# Patient Record
Sex: FEMALE | Race: WHITE | NOT HISPANIC OR LATINO
[De-identification: names, ages, dates, MRNs, and addresses within clinical notes are randomized per-mention and may not be internally consistent; named-entity substitution may affect disease eponyms.]

---

## 2021-04-30 PROBLEM — Z00.00 ENCOUNTER FOR PREVENTIVE HEALTH EXAMINATION: Status: ACTIVE | Noted: 2021-04-30

## 2021-05-20 ENCOUNTER — APPOINTMENT (OUTPATIENT)
Dept: OBGYN | Facility: CLINIC | Age: 21
End: 2021-05-20
Payer: COMMERCIAL

## 2021-05-20 VITALS
WEIGHT: 125 LBS | HEIGHT: 63 IN | DIASTOLIC BLOOD PRESSURE: 68 MMHG | SYSTOLIC BLOOD PRESSURE: 102 MMHG | BODY MASS INDEX: 22.15 KG/M2 | TEMPERATURE: 98 F

## 2021-05-20 DIAGNOSIS — Z30.011 ENCOUNTER FOR INITIAL PRESCRIPTION OF CONTRACEPTIVE PILLS: ICD-10-CM

## 2021-05-20 DIAGNOSIS — Z87.42 PERSONAL HISTORY OF OTHER DISEASES OF THE FEMALE GENITAL TRACT: ICD-10-CM

## 2021-05-20 DIAGNOSIS — N94.6 DYSMENORRHEA, UNSPECIFIED: ICD-10-CM

## 2021-05-20 DIAGNOSIS — N92.0 EXCESSIVE AND FREQUENT MENSTRUATION WITH REGULAR CYCLE: ICD-10-CM

## 2021-05-20 PROCEDURE — 99213 OFFICE O/P EST LOW 20 MIN: CPT | Mod: 25

## 2021-05-20 PROCEDURE — 99395 PREV VISIT EST AGE 18-39: CPT

## 2021-05-20 NOTE — HISTORY OF PRESENT ILLNESS
[FreeTextEntry1] : Patient is 20 years old para 0-0-0-0 last menstrual period May 7, 2021\par She has a history of regular menstrual periods every month lasting approximately 8 days which are heavy and painful.  She is interested in starting oral contraceptives

## 2021-05-20 NOTE — DISCUSSION/SUMMARY
[FreeTextEntry1] : Pap done\par Self breast exam stressed\par Patient given 2 sample packages of oral contraceptives in the form of Lo Loestrin Fe with instructions/precautions, will continue with prescription if patient tolerates trial of oral contraceptives.\par Keep menstrual calendar\par Follow-up 6 months or as needed

## 2021-07-25 ENCOUNTER — EMERGENCY (EMERGENCY)
Facility: HOSPITAL | Age: 21
LOS: 0 days | Discharge: HOME | End: 2021-07-25
Attending: EMERGENCY MEDICINE | Admitting: EMERGENCY MEDICINE
Payer: COMMERCIAL

## 2021-07-25 VITALS
OXYGEN SATURATION: 99 % | RESPIRATION RATE: 18 BRPM | SYSTOLIC BLOOD PRESSURE: 100 MMHG | HEART RATE: 65 BPM | DIASTOLIC BLOOD PRESSURE: 64 MMHG

## 2021-07-25 VITALS
RESPIRATION RATE: 18 BRPM | OXYGEN SATURATION: 100 % | TEMPERATURE: 99 F | SYSTOLIC BLOOD PRESSURE: 128 MMHG | HEART RATE: 81 BPM | DIASTOLIC BLOOD PRESSURE: 71 MMHG | WEIGHT: 125 LBS

## 2021-07-25 DIAGNOSIS — R20.2 PARESTHESIA OF SKIN: ICD-10-CM

## 2021-07-25 DIAGNOSIS — M79.10 MYALGIA, UNSPECIFIED SITE: ICD-10-CM

## 2021-07-25 DIAGNOSIS — Z88.0 ALLERGY STATUS TO PENICILLIN: ICD-10-CM

## 2021-07-25 DIAGNOSIS — J02.9 ACUTE PHARYNGITIS, UNSPECIFIED: ICD-10-CM

## 2021-07-25 DIAGNOSIS — R50.9 FEVER, UNSPECIFIED: ICD-10-CM

## 2021-07-25 DIAGNOSIS — Z88.8 ALLERGY STATUS TO OTHER DRUGS, MEDICAMENTS AND BIOLOGICAL SUBSTANCES: ICD-10-CM

## 2021-07-25 LAB
ALBUMIN SERPL ELPH-MCNC: 4.9 G/DL — SIGNIFICANT CHANGE UP (ref 3.5–5.2)
ALP SERPL-CCNC: 81 U/L — SIGNIFICANT CHANGE UP (ref 30–115)
ALT FLD-CCNC: 18 U/L — SIGNIFICANT CHANGE UP (ref 14–37)
ANION GAP SERPL CALC-SCNC: 13 MMOL/L — SIGNIFICANT CHANGE UP (ref 7–14)
APTT BLD: 30.9 SEC — SIGNIFICANT CHANGE UP (ref 27–39.2)
AST SERPL-CCNC: 50 U/L — HIGH (ref 14–37)
BASOPHILS # BLD AUTO: 0.05 K/UL — SIGNIFICANT CHANGE UP (ref 0–0.2)
BASOPHILS NFR BLD AUTO: 0.7 % — SIGNIFICANT CHANGE UP (ref 0–1)
BILIRUB SERPL-MCNC: 0.2 MG/DL — SIGNIFICANT CHANGE UP (ref 0.2–1.2)
BUN SERPL-MCNC: 11 MG/DL — SIGNIFICANT CHANGE UP (ref 10–20)
CALCIUM SERPL-MCNC: 10.3 MG/DL — HIGH (ref 8.5–10.1)
CHLORIDE SERPL-SCNC: 103 MMOL/L — SIGNIFICANT CHANGE UP (ref 98–110)
CO2 SERPL-SCNC: 23 MMOL/L — SIGNIFICANT CHANGE UP (ref 17–32)
CREAT SERPL-MCNC: 0.8 MG/DL — SIGNIFICANT CHANGE UP (ref 0.3–1)
D DIMER BLD IA.RAPID-MCNC: 81 NG/ML DDU — SIGNIFICANT CHANGE UP (ref 0–230)
EOSINOPHIL # BLD AUTO: 0.06 K/UL — SIGNIFICANT CHANGE UP (ref 0–0.7)
EOSINOPHIL NFR BLD AUTO: 0.9 % — SIGNIFICANT CHANGE UP (ref 0–8)
GLUCOSE SERPL-MCNC: 97 MG/DL — SIGNIFICANT CHANGE UP (ref 70–99)
HCG SERPL QL: NEGATIVE — SIGNIFICANT CHANGE UP
HCT VFR BLD CALC: 44.5 % — SIGNIFICANT CHANGE UP (ref 37–47)
HGB BLD-MCNC: 14.3 G/DL — SIGNIFICANT CHANGE UP (ref 12–16)
IMM GRANULOCYTES NFR BLD AUTO: 0.4 % — HIGH (ref 0.1–0.3)
INR BLD: 1.06 RATIO — SIGNIFICANT CHANGE UP (ref 0.65–1.3)
LYMPHOCYTES # BLD AUTO: 1.7 K/UL — SIGNIFICANT CHANGE UP (ref 1.2–3.4)
LYMPHOCYTES # BLD AUTO: 24.3 % — SIGNIFICANT CHANGE UP (ref 20.5–51.1)
MAGNESIUM SERPL-MCNC: 2.3 MG/DL — SIGNIFICANT CHANGE UP (ref 1.8–2.4)
MCHC RBC-ENTMCNC: 28.7 PG — SIGNIFICANT CHANGE UP (ref 27–31)
MCHC RBC-ENTMCNC: 32.1 G/DL — SIGNIFICANT CHANGE UP (ref 32–37)
MCV RBC AUTO: 89.2 FL — SIGNIFICANT CHANGE UP (ref 81–99)
MONOCYTES # BLD AUTO: 0.66 K/UL — HIGH (ref 0.1–0.6)
MONOCYTES NFR BLD AUTO: 9.4 % — HIGH (ref 1.7–9.3)
NEUTROPHILS # BLD AUTO: 4.5 K/UL — SIGNIFICANT CHANGE UP (ref 1.4–6.5)
NEUTROPHILS NFR BLD AUTO: 64.3 % — SIGNIFICANT CHANGE UP (ref 42.2–75.2)
NRBC # BLD: 0 /100 WBCS — SIGNIFICANT CHANGE UP (ref 0–0)
PLATELET # BLD AUTO: 209 K/UL — SIGNIFICANT CHANGE UP (ref 130–400)
POTASSIUM SERPL-MCNC: 5.5 MMOL/L — HIGH (ref 3.5–5)
POTASSIUM SERPL-SCNC: 5.5 MMOL/L — HIGH (ref 3.5–5)
PROT SERPL-MCNC: 7.6 G/DL — SIGNIFICANT CHANGE UP (ref 6.1–8)
PROTHROM AB SERPL-ACNC: 12.2 SEC — SIGNIFICANT CHANGE UP (ref 9.95–12.87)
RBC # BLD: 4.99 M/UL — SIGNIFICANT CHANGE UP (ref 4.2–5.4)
RBC # FLD: 14.2 % — SIGNIFICANT CHANGE UP (ref 11.5–14.5)
SODIUM SERPL-SCNC: 139 MMOL/L — SIGNIFICANT CHANGE UP (ref 135–146)
WBC # BLD: 7 K/UL — SIGNIFICANT CHANGE UP (ref 4.8–10.8)
WBC # FLD AUTO: 7 K/UL — SIGNIFICANT CHANGE UP (ref 4.8–10.8)

## 2021-07-25 PROCEDURE — 93010 ELECTROCARDIOGRAM REPORT: CPT

## 2021-07-25 PROCEDURE — 99285 EMERGENCY DEPT VISIT HI MDM: CPT

## 2021-07-25 PROCEDURE — 71046 X-RAY EXAM CHEST 2 VIEWS: CPT | Mod: 26

## 2021-07-25 NOTE — ED PROVIDER NOTE - CLINICAL SUMMARY MEDICAL DECISION MAKING FREE TEXT BOX
Results reviewed and d/w pt and dad.  Pt will follow with her her Pediatrician and return if any worsening symptoms or concerns.

## 2021-07-25 NOTE — ED PROVIDER NOTE - NSFOLLOWUPINSTRUCTIONS_ED_ALL_ED_FT
Paresthesia    Paresthesia is an abnormal burning or prickling sensation. This sensation is generally felt in the hands, arms, legs, or feet. However, it may occur in any part of the body. Usually, it is not painful. The feeling may be described as:    Tingling or numbness.  Pins and needles.  Skin crawling.  Buzzing.  Limbs falling asleep.  Itching.    Most people experience temporary (transient) paresthesia at some time in their lives. Paresthesia may occur when you breathe too quickly (hyperventilation). It can also occur without any apparent cause. Commonly, paresthesia occurs when pressure is placed on a nerve. The sensation quickly goes away after the pressure is removed. For some people, however, paresthesia is a long-lasting (chronic) condition that is caused by an underlying disorder. If you continue to have paresthesia, you may need further medical evaluation.    Follow these instructions at home:  Watch your condition for any changes. Taking the following actions may help to lessen any discomfort that you are feeling:    Avoid drinking alcohol.  Try acupuncture or massage to help relieve your symptoms.  Keep all follow-up visits as directed by your health care provider. This is important.    Contact a health care provider if:  You continue to have episodes of paresthesia.  Your burning or prickling feeling gets worse when you walk.  You have pain, cramps, or dizziness.  You develop a rash.  Get help right away if:  You feel weak.  You have trouble walking or moving.  You have problems with speech, understanding, or vision.  You feel confused.  You cannot control your bladder or bowel movements.  You have numbness after an injury.  You faint.  This information is not intended to replace advice given to you by your health care provider. Make sure you discuss any questions you have with your health care provider.

## 2021-07-25 NOTE — ED PROVIDER NOTE - PATIENT PORTAL LINK FT
You can access the FollowMyHealth Patient Portal offered by [facility] by registering at the following website: [email or web address]. By joining [employer]’s FollowMyHealth portal, you will also be able to view your health information using other applications (apps) compatible with our system.

## 2021-07-25 NOTE — ED PROVIDER NOTE - IV ALTEPLASE DOOR HIDDEN
show Nosebleeds Normal Treatment: I explained this is common when taking isotretinoin. I recommended saline mist in each nostril multiple times a day. If this worsens they will contact us.

## 2021-07-25 NOTE — ED PROVIDER NOTE - ATTENDING CONTRIBUTION TO CARE
21 yo F presents with diffuse intermittent paresthesias for the last 2 days.  no pain, no weakness, no SOB.  Pt received 2nd COVID vaccine this past week.  Also with slight sore throat.  no fevers, On exam pt in NAD AAO x 3, speech is clear and fluent, face symmetrical, Lungs cta b/l, abd soft nt nd, no edema, FROM, steady gait, sensation intact

## 2021-07-25 NOTE — ED PROVIDER NOTE - OBJECTIVE STATEMENT
Patient c/o intermittent paresthesias which migrate around body past 2 days, Had 2nd covid vaccination Monday , Mild Fever and body aches for 48 hours,

## 2021-07-28 ENCOUNTER — EMERGENCY (EMERGENCY)
Facility: HOSPITAL | Age: 21
LOS: 0 days | Discharge: HOME | End: 2021-07-28
Attending: EMERGENCY MEDICINE | Admitting: EMERGENCY MEDICINE
Payer: COMMERCIAL

## 2021-07-28 VITALS
SYSTOLIC BLOOD PRESSURE: 121 MMHG | WEIGHT: 129.41 LBS | TEMPERATURE: 99 F | RESPIRATION RATE: 18 BRPM | OXYGEN SATURATION: 98 % | HEART RATE: 87 BPM | DIASTOLIC BLOOD PRESSURE: 65 MMHG

## 2021-07-28 DIAGNOSIS — R20.2 PARESTHESIA OF SKIN: ICD-10-CM

## 2021-07-28 DIAGNOSIS — M62.81 MUSCLE WEAKNESS (GENERALIZED): ICD-10-CM

## 2021-07-28 DIAGNOSIS — Z88.8 ALLERGY STATUS TO OTHER DRUGS, MEDICAMENTS AND BIOLOGICAL SUBSTANCES STATUS: ICD-10-CM

## 2021-07-28 DIAGNOSIS — R53.83 OTHER FATIGUE: ICD-10-CM

## 2021-07-28 DIAGNOSIS — Z88.0 ALLERGY STATUS TO PENICILLIN: ICD-10-CM

## 2021-07-28 PROBLEM — Z78.9 OTHER SPECIFIED HEALTH STATUS: Chronic | Status: ACTIVE | Noted: 2021-07-25

## 2021-07-28 PROCEDURE — 99284 EMERGENCY DEPT VISIT MOD MDM: CPT

## 2021-07-28 PROCEDURE — 93010 ELECTROCARDIOGRAM REPORT: CPT

## 2021-07-28 NOTE — ED PROVIDER NOTE - OBJECTIVE STATEMENT
19 yo F with no PMH presenting for intermittent tingling in b/l hands and feet for 7 days and 2 days of weakness in b/l LE. Patient states tingling started 2 days after receiving second pzifer vaccine. Was seen at [facility] with normal blood work and EKG. Denies falls, head/back/neck trauma, fever, numbness, dizziness, vision changes, chest pain, SOB, NVD.

## 2021-07-28 NOTE — ED PROVIDER NOTE - CARE PROVIDER_API CALL
Fam Hamilton)  Child Neurology; EEGEpilepsy; Pediatric Neurology  [address] [place] [address]  Phone: [phone]  Fax: [phone]  Follow Up Time: 1-3 Days

## 2021-07-28 NOTE — ED PROVIDER NOTE - PATIENT PORTAL LINK FT
You can access the FollowMyHealth Patient Portal offered by [facility] by registering at the following website: [email or web address]. By joining [employer]’s FollowMyHealth portal, you will also be able to view your health information using other applications (apps) compatible with our system.

## 2021-07-28 NOTE — ED PEDIATRIC TRIAGE NOTE - CHIEF COMPLAINT QUOTE
patient received COVID 19 vaccine 9 days ago, complains of ascending numbness x 4 days with weakness, denies SOB at this time.

## 2021-07-28 NOTE — ED PROVIDER NOTE - NSFOLLOWUPINSTRUCTIONS_ED_ALL_ED_FT
Fatigue    WHAT YOU NEED TO KNOW:    Fatigue is mental and physical exhaustion that does not get better with rest. Fatigue may make daily activities difficult or cause extreme sleepiness. It is normal to feel tired sometimes, but long-term fatigue may be a sign of serious illness.    DISCHARGE INSTRUCTIONS:    Return to the emergency department if:     You have chest pain.       You have difficulty breathing.     Contact your healthcare provider if:     You have a cough that gets worse, or does not go away.       You see blood in your urine or bowel movement.       You have numbness or tingling around your mouth or in an arm or leg.       You faint, feel dizzy, or have vision changes.       You have swelling in your lymph nodes.       You are a woman and have vaginal bleeding that is not normal for you, or is not expected.       You lose weight without trying, or you have trouble eating.       You feel weak or have muscle pain.       You have pain or swelling in your joints.      You have questions or concerns about your condition or care.     Follow up with your healthcare provider as directed: You may need more tests. Your healthcare provider may also refer you to a specialist. Write down your questions so you remember to ask them during your visits.    Manage fatigue:     Keep a fatigue diary. Include anything that makes you feel more tired or less tired. Bring the diary with you to follow-up visits with your provider.      Exercise as directed. Exercise can help you feel more alert. Exercise can also help you manage stress or relieve depression. Try to get at least 30 minutes of exercise most days of the week.      Keep a regular sleep schedule. Go to bed and wake up at the same times every day. Limit naps to 1 hour each day. A nap can improve fatigue, but a long nap may make it harder to go to sleep at night.      Plan and limit your activities. Limit the number of activities such as shopping and cleaning you do each day. If possible, try to spread out your trips throughout the week. Plan ahead so you are not rushing to get something done. Only do activities that you have the energy to complete. Take breaks between activities. Ask for help if you need it. Another person may be able to drive you or help with daily activities.      Eat a variety of healthy foods. Healthy foods include fruits, vegetables, whole-grain breads, low-fat dairy products, beans, lean meats, and fish. Good nutrition can help manage fatigue.      Limit caffeine and alcohol. These can make it difficult to fall or stay asleep. Women should limit alcohol to 1 drink a day. Men should limit alcohol to 2 drinks a day. A drink of alcohol is 12 ounces of beer, 5 ounces of wine, or 1½ ounces of liquor. Ask our healthcare provider how much caffeine is safe for you.      Do not smoke. Nicotine and other chemicals in cigarettes and cigars can cause lung damage and increase fatigue. Ask your healthcare provider for information if you currently smoke and need help to quit. E-cigarettes or smokeless tobacco still contain nicotine. Talk to your healthcare provider before you use these products.          © Copyright [employer] 2019 All illustrations and images included in CareNotes are the copyrighted property of [employer] or [employer].

## 2021-07-28 NOTE — ED PROVIDER NOTE - PHYSICAL EXAMINATION
CONSTITUTIONAL: well-appearing, in NAD  SKIN: Warm dry, normal skin turgor  HEAD: NCAT  EYES: EOMI, PERRLA, no scleral icterus, conjunctiva pink  ENT: normal pharynx with no erythema or exudates  NECK: Supple; non tender. Full ROM.  CARD: RRR, no murmurs.  RESP: clear to ausculation b/l. No crackles or wheezing.  ABD: soft, non-tender, non-distended, no rebound or guarding.  EXT: Full ROM, no bony tenderness, no pedal edema, no calf tenderness  NEURO: strength and sensation 5/5 in b/l upper and lower extremities. CN II-XII intact. Cerebellar testing normal. Normal romberg, heel to shin, and gait.   PSYCH: Cooperative, appropriate.

## 2021-07-28 NOTE — ED PROVIDER NOTE - NS ED ROS FT
Constitutional:  (-) fever, (-) chills, (-) lethargy  Eyes:  (-) eye pain (-) visual changes  ENMT: (-) nasal discharge, (-) sore throat. (-) neck pain or stiffness  Cardiac: (-) chest pain (-) palpitations  Respiratory:  (-) cough (-) respiratory distress.   GI:  (-) nausea (-) vomiting (-) diarrhea (-) abdominal pain.  :  (-) dysuria (-) frequency (-) burning.  MS:  (-) back pain (-) joint pain.  Neuro:  (-) headache (-) numbness (+) tingling (+) focal weakness, b/l LE  Skin:  (-) rash  Except as documented in the HPI,  all other systems are negative

## 2021-07-28 NOTE — ED PROVIDER NOTE - CLINICAL SUMMARY MEDICAL DECISION MAKING FREE TEXT BOX
19 y/o F with no PMH presents to the ED with tingling in the hands and feet as well as new weakness in her hands and feet. Pt states that the symptoms started last Wednesday, 2 days after getting her second covid-19 vaccination. Pt states that she had tingling in her b/l hands and feet that have resolved. She was seen on Sunday at [facility] with normal blood work and normal EKG. Pt is now with sudden onset weakness in her hands and feet since yesterday. No falls, head trauma or back trauma. No IVDU. No neck pain, fevers, or chills. On exam: Gen - NAD, Head - NCAT, TMs - clear b/l, Pharynx - clear, MMM, Heart - RRR, no m/g/r, Lungs - CTAB, no w/c/r, Abdomen - soft, NT, ND, Skin - No rash, Ext- FROM, no edema, erythema, ecchymosis, Neuro - CN 2-12 intact, nl strength and sensation, nl gait, normal finger to nose, negative Romberg, normal gait. Diagnosis: Fatigue. No concern for guillain-barre syndrome. Pt d/c home with PMD follow u. 19 y/o F with no PMH presents to the ED with tingling in the hands and feet as well as new weess in her hands and feet. Pt states that the symptoms started last Wednesday, 2 days after getting her second covid-19 vaccination. Pt states that she had tingling in her b/l hands and feet that have resolved. She was seen on Sunday at [facility] with normal blood work and normal EKG. Pt is now with sudden onset weakness in her hands and feet since yesterday. No falls, head trauma or back trauma. No IVDU. No neck pain, fevers, or chills. On exam: Gen - NAD, Head - NCAT, TMs - clear b/l, Pharynx - clear, MMM, Heart - RRR, no m/g/r, Lungs - CTAB, no w/c/r, Abdomen - soft, NT, ND, Skin - No rash, Ext- FROM, no edema, erythema, ecchymosis, Neuro - CN 2-12 intact, nl strength and sensation, nl gait, normal finger to nose, negative Romberg, normal gait. Diagnosis: Fatigue. No concern for guillain-barre syndrome. Pt d/c home with PMD follow u.

## 2021-11-15 DIAGNOSIS — B37.3 CANDIDIASIS OF VULVA AND VAGINA: ICD-10-CM

## 2021-11-15 RX ORDER — FLUCONAZOLE 150 MG/1
150 TABLET ORAL
Qty: 1 | Refills: 0 | Status: ACTIVE | COMMUNITY
Start: 2021-11-15 | End: 1900-01-01

## 2021-11-15 RX ORDER — CLOTRIMAZOLE AND BETAMETHASONE DIPROPIONATE 10; .5 MG/G; MG/G
1-0.05 CREAM TOPICAL TWICE DAILY
Qty: 1 | Refills: 0 | Status: ACTIVE | COMMUNITY
Start: 2021-11-15 | End: 1900-01-01

## 2021-12-16 ENCOUNTER — APPOINTMENT (OUTPATIENT)
Dept: NEUROLOGY | Facility: CLINIC | Age: 21
End: 2021-12-16

## 2022-07-05 ENCOUNTER — APPOINTMENT (OUTPATIENT)
Dept: OBGYN | Facility: CLINIC | Age: 22
End: 2022-07-05

## 2022-07-05 VITALS
BODY MASS INDEX: 21.62 KG/M2 | WEIGHT: 122 LBS | DIASTOLIC BLOOD PRESSURE: 62 MMHG | HEIGHT: 63 IN | SYSTOLIC BLOOD PRESSURE: 102 MMHG

## 2022-07-05 DIAGNOSIS — L29.2 PRURITUS VULVAE: ICD-10-CM

## 2022-07-05 DIAGNOSIS — B37.3 CANDIDIASIS OF VULVA AND VAGINA: ICD-10-CM

## 2022-07-05 DIAGNOSIS — N89.8 OTHER SPECIFIED NONINFLAMMATORY DISORDERS OF VAGINA: ICD-10-CM

## 2022-07-05 DIAGNOSIS — N76.2 ACUTE VULVITIS: ICD-10-CM

## 2022-07-05 PROCEDURE — 99395 PREV VISIT EST AGE 18-39: CPT

## 2022-07-05 PROCEDURE — 99213 OFFICE O/P EST LOW 20 MIN: CPT | Mod: 25

## 2022-07-05 RX ORDER — FLUCONAZOLE 150 MG/1
150 TABLET ORAL
Qty: 1 | Refills: 0 | Status: ACTIVE | COMMUNITY
Start: 2022-07-05 | End: 1900-01-01

## 2022-07-05 RX ORDER — CLOTRIMAZOLE AND BETAMETHASONE DIPROPIONATE 10; .5 MG/G; MG/G
1-0.05 CREAM TOPICAL TWICE DAILY
Qty: 1 | Refills: 1 | Status: ACTIVE | COMMUNITY
Start: 2022-07-05 | End: 1900-01-01

## 2022-07-05 NOTE — HISTORY OF PRESENT ILLNESS
[FreeTextEntry1] : Patient is 21 years old para 0-0-0-0 last menstrual period June 22, 2022.\par Patient notes regular menstrual periods every month and is not presently on oral contraceptives.\par Patient states that she notes vaginal discharge with itch and vulvar inflammation.

## 2022-07-05 NOTE — PHYSICAL EXAM
[Appropriately responsive] : appropriately responsive [Alert] : alert [No Acute Distress] : no acute distress [No Lymphadenopathy] : no lymphadenopathy [Regular Rate Rhythm] : regular rate rhythm [No Murmurs] : no murmurs [Clear to Auscultation B/L] : clear to auscultation bilaterally [Soft] : soft [Non-tender] : non-tender [Non-distended] : non-distended [No HSM] : No HSM [No Lesions] : no lesions Breath sounds clear and equal bilaterally. [No Mass] : no mass [Oriented x3] : oriented x3 [Examination Of The Breasts] : a normal appearance [No Masses] : no breast masses were palpable [Vulvitis] : vulvitis [Labia Majora] : normal [Labia Minora] : normal [Discharge] : a  ~M vaginal discharge was present [Normal] : normal [Uterine Adnexae] : normal

## 2022-07-05 NOTE — DISCUSSION/SUMMARY
[FreeTextEntry1] : Pap, gonorrhea and Chlamydia test done\par B VV test done\par Self breast exam stressed\par Keep menstrual calendar\par Prescribed Diflucan/Lotrisone cream\par Follow-up yearly or as needed\par

## 2023-07-13 ENCOUNTER — APPOINTMENT (OUTPATIENT)
Dept: OBGYN | Facility: CLINIC | Age: 23
End: 2023-07-13
Payer: COMMERCIAL

## 2023-07-13 VITALS
DIASTOLIC BLOOD PRESSURE: 60 MMHG | BODY MASS INDEX: 20.2 KG/M2 | HEIGHT: 63 IN | SYSTOLIC BLOOD PRESSURE: 102 MMHG | WEIGHT: 114 LBS

## 2023-07-13 DIAGNOSIS — Z82.49 FAMILY HISTORY OF ISCHEMIC HEART DISEASE AND OTHER DISEASES OF THE CIRCULATORY SYSTEM: ICD-10-CM

## 2023-07-13 DIAGNOSIS — Z78.9 OTHER SPECIFIED HEALTH STATUS: ICD-10-CM

## 2023-07-13 DIAGNOSIS — B37.31 ACUTE CANDIDIASIS OF VULVA AND VAGINA: ICD-10-CM

## 2023-07-13 DIAGNOSIS — Z86.59 PERSONAL HISTORY OF OTHER MENTAL AND BEHAVIORAL DISORDERS: ICD-10-CM

## 2023-07-13 DIAGNOSIS — N93.0 POSTCOITAL AND CONTACT BLEEDING: ICD-10-CM

## 2023-07-13 DIAGNOSIS — Z01.411 ENCOUNTER FOR GYNECOLOGICAL EXAMINATION (GENERAL) (ROUTINE) WITH ABNORMAL FINDINGS: ICD-10-CM

## 2023-07-13 DIAGNOSIS — N72 INFLAMMATORY DISEASE OF CERVIX UTERI: ICD-10-CM

## 2023-07-13 PROCEDURE — 99213 OFFICE O/P EST LOW 20 MIN: CPT | Mod: 25

## 2023-07-13 PROCEDURE — 99395 PREV VISIT EST AGE 18-39: CPT

## 2023-07-13 RX ORDER — TERCONAZOLE 4 MG/G
0.4 CREAM VAGINAL
Qty: 1 | Refills: 1 | Status: ACTIVE | COMMUNITY
Start: 2023-07-13 | End: 1900-01-01

## 2023-07-13 NOTE — HISTORY OF PRESENT ILLNESS
[FreeTextEntry1] : Patient is 22 years old para 0-0-0-0 last menstrual period June 26, 2023.\par Patient notes regular menstrual periods every month.\par Patient states that she has been noting postcoital spotting.

## 2023-07-13 NOTE — DISCUSSION/SUMMARY
[FreeTextEntry1] : Pap, gonorrhea and Chlamydia test done\par B VV test done\par Issues regarding cervicitis/postcoital spotting/Candida vaginitis discussed with patient\par Prescribed terconazole 7 with instructions\par Keep menstrual calendar\par Follow-up yearly or as needed

## 2023-07-13 NOTE — PHYSICAL EXAM
[Chaperone Present] : A chaperone was present in the examining room during all aspects of the physical examination [FreeTextEntry1] : You [Appropriately responsive] : appropriately responsive [Alert] : alert [No Acute Distress] : no acute distress [No Lymphadenopathy] : no lymphadenopathy [Regular Rate Rhythm] : regular rate rhythm [No Murmurs] : no murmurs [Clear to Auscultation B/L] : clear to auscultation bilaterally [Soft] : soft [Non-tender] : non-tender [Non-distended] : non-distended [No HSM] : No HSM [No Lesions] : no lesions [No Mass] : no mass [Oriented x3] : oriented x3 [Examination Of The Breasts] : a normal appearance [No Masses] : no breast masses were palpable [Labia Majora] : normal [Labia Minora] : normal [Discharge] : a  ~M vaginal discharge was present [Normal] : normal [Uterine Adnexae] : normal [FreeTextEntry5] : Mild cervicitis noted

## 2023-11-21 NOTE — ED PROVIDER NOTE - ST/T WAVE
no acute changes Paramedian Forehead Flap Text: A decision was made to reconstruct the defect utilizing an interpolation axial flap and a staged reconstruction.  A telfa template was made of the defect.  This telfa template was then used to outline the paramedian forehead pedicle flap.  The donor area for the pedicle flap was then injected with anesthesia.  The flap was excised through the skin and subcutaneous tissue down to the layer of the underlying musculature.  The pedicle flap was carefully excised within this deep plane to maintain its blood supply.  The edges of the donor site were undermined.   The donor site was closed in a primary fashion.  The pedicle was then rotated into position and sutured.  Once the tube was sutured into place, adequate blood supply was confirmed with blanching and refill.  The pedicle was then wrapped with xeroform gauze and dressed appropriately with a telfa and gauze bandage to ensure continued blood supply and protect the attached pedicle.

## 2023-12-29 ENCOUNTER — NON-APPOINTMENT (OUTPATIENT)
Age: 23
End: 2023-12-29

## 2024-06-19 ENCOUNTER — NON-APPOINTMENT (OUTPATIENT)
Age: 24
End: 2024-06-19

## 2024-07-15 ENCOUNTER — NON-APPOINTMENT (OUTPATIENT)
Age: 24
End: 2024-07-15

## 2024-09-12 ENCOUNTER — APPOINTMENT (OUTPATIENT)
Dept: OBGYN | Facility: CLINIC | Age: 24
End: 2024-09-12
Payer: COMMERCIAL

## 2024-09-12 VITALS
DIASTOLIC BLOOD PRESSURE: 59 MMHG | BODY MASS INDEX: 19.67 KG/M2 | SYSTOLIC BLOOD PRESSURE: 95 MMHG | HEIGHT: 63 IN | WEIGHT: 111 LBS

## 2024-09-12 DIAGNOSIS — Z01.419 ENCOUNTER FOR GYNECOLOGICAL EXAMINATION (GENERAL) (ROUTINE) W/OUT ABNORMAL FINDINGS: ICD-10-CM

## 2024-09-12 PROCEDURE — 99459 PELVIC EXAMINATION: CPT

## 2024-09-12 PROCEDURE — 99395 PREV VISIT EST AGE 18-39: CPT | Mod: 25

## 2024-09-12 NOTE — DISCUSSION/SUMMARY
[FreeTextEntry1] : Pap done Self breast exam stressed Keep menstrual calendar Contraceptive options discussed Follow-up yearly or as needed

## 2024-09-12 NOTE — HISTORY OF PRESENT ILLNESS
[FreeTextEntry1] : Patient is 22 years old para 0-0-0-0 last menstrual period June 26, 2023. Patient notes regular menstrual periods every month. She is presently without complaints She is employed as a nurse at [employer]

## 2024-09-12 NOTE — PHYSICAL EXAM
[Chaperone Present] : A chaperone was present in the examining room during all aspects of the physical examination [FreeTextEntry2] : Cindy [Appropriately responsive] : appropriately responsive [Alert] : alert [No Acute Distress] : no acute distress [No Lymphadenopathy] : no lymphadenopathy [Regular Rate Rhythm] : regular rate rhythm [No Murmurs] : no murmurs [Clear to Auscultation B/L] : clear to auscultation bilaterally [Soft] : soft [Non-tender] : non-tender [Non-distended] : non-distended [No HSM] : No HSM [No Lesions] : no lesions [No Mass] : no mass [Oriented x3] : oriented x3 [Examination Of The Breasts] : a normal appearance [No Masses] : no breast masses were palpable [Labia Majora] : normal [Labia Minora] : normal [Discharge] : a  ~M vaginal discharge was present [Normal] : normal [Uterine Adnexae] : normal [FreeTextEntry5] : Mild cervicitis noted

## 2025-01-04 ENCOUNTER — NON-APPOINTMENT (OUTPATIENT)
Age: 25
End: 2025-01-04